# Patient Record
Sex: FEMALE | Race: WHITE | NOT HISPANIC OR LATINO | Employment: OTHER | URBAN - NONMETROPOLITAN AREA
[De-identification: names, ages, dates, MRNs, and addresses within clinical notes are randomized per-mention and may not be internally consistent; named-entity substitution may affect disease eponyms.]

---

## 2017-03-26 ENCOUNTER — HOSPITAL ENCOUNTER (EMERGENCY)
Facility: HOSPITAL | Age: 57
End: 2017-03-26
Attending: EMERGENCY MEDICINE | Admitting: EMERGENCY MEDICINE

## 2017-03-26 ENCOUNTER — APPOINTMENT (OUTPATIENT)
Dept: GENERAL RADIOLOGY | Facility: HOSPITAL | Age: 57
End: 2017-03-26

## 2017-03-26 VITALS
HEIGHT: 68 IN | TEMPERATURE: 96.9 F | DIASTOLIC BLOOD PRESSURE: 78 MMHG | HEART RATE: 109 BPM | OXYGEN SATURATION: 96 % | SYSTOLIC BLOOD PRESSURE: 97 MMHG | WEIGHT: 138.89 LBS | BODY MASS INDEX: 21.05 KG/M2 | RESPIRATION RATE: 20 BRPM

## 2017-03-26 DIAGNOSIS — R74.8 ELEVATED AMYLASE AND LIPASE: ICD-10-CM

## 2017-03-26 DIAGNOSIS — R79.89 ELEVATED BRAIN NATRIURETIC PEPTIDE (BNP) LEVEL: ICD-10-CM

## 2017-03-26 DIAGNOSIS — R09.2 RESPIRATORY ARREST (HCC): ICD-10-CM

## 2017-03-26 DIAGNOSIS — I46.9 CARDIAC ARREST (HCC): Primary | ICD-10-CM

## 2017-03-26 DIAGNOSIS — D72.829 LEUKOCYTOSIS, UNSPECIFIED TYPE: ICD-10-CM

## 2017-03-26 DIAGNOSIS — J18.9 PNEUMONIA OF BOTH UPPER LOBES DUE TO INFECTIOUS ORGANISM: ICD-10-CM

## 2017-03-26 PROBLEM — I25.10 CORONARY ARTERY DISEASE: Status: ACTIVE | Noted: 2017-03-26

## 2017-03-26 PROBLEM — J44.9 COPD (CHRONIC OBSTRUCTIVE PULMONARY DISEASE) (HCC): Status: ACTIVE | Noted: 2017-03-26

## 2017-03-26 PROBLEM — I71.40 AAA (ABDOMINAL AORTIC ANEURYSM) (HCC): Status: ACTIVE | Noted: 2017-03-26

## 2017-03-26 PROBLEM — I50.9 CHF (CONGESTIVE HEART FAILURE) (HCC): Status: ACTIVE | Noted: 2017-03-26

## 2017-03-26 LAB
ALBUMIN SERPL-MCNC: 3.1 G/DL (ref 3.5–5)
ALBUMIN/GLOB SERPL: 1.3 G/DL (ref 1.1–2.5)
ALP SERPL-CCNC: 80 U/L (ref 24–120)
ALT SERPL W P-5'-P-CCNC: 59 U/L (ref 0–54)
AMPHET+METHAMPHET UR QL: NEGATIVE
AMYLASE SERPL-CCNC: 334 U/L (ref 30–110)
ANION GAP SERPL CALCULATED.3IONS-SCNC: 15 MMOL/L (ref 4–13)
APTT PPP: 32.4 SECONDS (ref 24.1–34.8)
ARTERIAL PATENCY WRIST A: ABNORMAL
AST SERPL-CCNC: 50 U/L (ref 7–45)
ATMOSPHERIC PRESS: ABNORMAL MMHG
BACTERIA UR QL AUTO: ABNORMAL /HPF
BARBITURATES UR QL SCN: NEGATIVE
BASE EXCESS BLDA CALC-SCNC: -8.6 MMOL/L (ref -2–2)
BDY SITE: ABNORMAL
BENZODIAZ UR QL SCN: POSITIVE
BILIRUB SERPL-MCNC: 0.2 MG/DL (ref 0.1–1)
BILIRUB UR QL STRIP: NEGATIVE
BUN BLD-MCNC: 30 MG/DL (ref 5–21)
BUN/CREAT SERPL: 28.3 (ref 7–25)
CALCIUM SPEC-SCNC: 8.4 MG/DL (ref 8.4–10.4)
CANNABINOIDS SERPL QL: NEGATIVE
CHLORIDE SERPL-SCNC: 89 MMOL/L (ref 98–110)
CK MB SERPL-CCNC: 6.41 NG/ML (ref 0–5)
CK MB SERPL-RTO: 4.5 % (ref 0–5.7)
CK SERPL-CCNC: 143 U/L (ref 0–203)
CLARITY UR: CLEAR
CO2 SERPL-SCNC: 28 MMOL/L (ref 24–31)
COCAINE UR QL: NEGATIVE
COHGB MFR BLD: 5.1 % (ref 0–5.1)
COLOR UR: YELLOW
CREAT BLD-MCNC: 1.06 MG/DL (ref 0.5–1.4)
D DIMER PPP FEU-MCNC: 4.32 MG/L (FEU) (ref 0–0.5)
DEPRECATED RDW RBC AUTO: 61.2 FL (ref 40–54)
ERYTHROCYTE [DISTWIDTH] IN BLOOD BY AUTOMATED COUNT: 15 % (ref 12–15)
ETHANOL UR QL: <0.01 %
GFR SERPL CREATININE-BSD FRML MDRD: 53 ML/MIN/1.73
GLOBULIN UR ELPH-MCNC: 2.3 GM/DL
GLUCOSE BLD-MCNC: 298 MG/DL (ref 70–100)
GLUCOSE UR STRIP-MCNC: NEGATIVE MG/DL
HCO3 BLDA-SCNC: 23.4 MMOL/L (ref 22–26)
HCT VFR BLD AUTO: 43.7 % (ref 37–47)
HGB BLD-MCNC: 12.6 G/DL (ref 12–16)
HGB BLDA-MCNC: 13.8 G/DL (ref 12–16)
HGB UR QL STRIP.AUTO: ABNORMAL
HOLD SPECIMEN: NORMAL
HOLD SPECIMEN: NORMAL
HOROWITZ INDEX BLD+IHG-RTO: 100 %
HYALINE CASTS UR QL AUTO: ABNORMAL /LPF
INR PPP: 1.02 (ref 0.91–1.09)
KETONES UR QL STRIP: NEGATIVE
LEUKOCYTE ESTERASE UR QL STRIP.AUTO: NEGATIVE
LIPASE SERPL-CCNC: 2390 U/L (ref 23–203)
LYMPHOCYTES # BLD MANUAL: 1.19 10*3/MM3 (ref 0.72–4.86)
LYMPHOCYTES NFR BLD MANUAL: 4 % (ref 15–45)
LYMPHOCYTES NFR BLD MANUAL: 6 % (ref 4–12)
Lab: ABNORMAL
MACROCYTES BLD QL SMEAR: ABNORMAL
MAGNESIUM SERPL-MCNC: 2.6 MG/DL (ref 1.4–2.2)
MCH RBC QN AUTO: 32.1 PG (ref 28–32)
MCHC RBC AUTO-ENTMCNC: 28.8 G/DL (ref 33–36)
MCV RBC AUTO: 111.5 FL (ref 82–98)
METAMYELOCYTES NFR BLD MANUAL: 4 % (ref 0–0)
METHADONE UR QL SCN: NEGATIVE
METHGB BLD QL: 0.3 % (ref 0.4–1.5)
MODALITY: ABNORMAL
MONOCYTES # BLD AUTO: 1.79 10*3/MM3 (ref 0.19–1.3)
MYOGLOBIN SERPL-MCNC: 719.8 NG/ML (ref 0–110)
NEUTROPHILS # BLD AUTO: 24.17 10*3/MM3 (ref 1.87–8.4)
NEUTROPHILS NFR BLD MANUAL: 72 % (ref 39–78)
NEUTS BAND NFR BLD MANUAL: 9 % (ref 0–10)
NITRITE UR QL STRIP: NEGATIVE
NOTIFIED BY: ABNORMAL
NOTIFIED WHO: ABNORMAL
NT-PROBNP SERPL-MCNC: ABNORMAL PG/ML (ref 0–900)
OPIATES UR QL: POSITIVE
OXYHGB MFR BLDV: 94.4 % (ref 94–97)
PCO2 BLDA: 84.3 MM HG (ref 35–45)
PCP UR QL SCN: NEGATIVE
PEEP RESPIRATORY: 5 CM[H2O]
PH BLDA: 7.06 PH UNITS (ref 7.35–7.45)
PH UR STRIP.AUTO: 5.5 [PH] (ref 5–8)
PLAT MORPH BLD: NORMAL
PLATELET # BLD AUTO: 168 10*3/MM3 (ref 130–400)
PMV BLD AUTO: 10.3 FL (ref 6–12)
PO2 BLDA: 295.9 MM HG (ref 80–100)
POTASSIUM BLD-SCNC: 5.3 MMOL/L (ref 3.5–5.3)
POTASSIUM BLDA-SCNC: 5.71 MMOL/L (ref 3.5–5)
PROCALCITONIN SERPL-MCNC: <0.25 NG/ML
PROT SERPL-MCNC: 5.4 G/DL (ref 6.3–8.7)
PROT UR QL STRIP: ABNORMAL
PROTHROMBIN TIME: 13.7 SECONDS (ref 11.9–14.6)
RBC # BLD AUTO: 3.92 10*6/MM3 (ref 4.2–5.4)
RBC # UR: ABNORMAL /HPF
REF LAB TEST METHOD: ABNORMAL
SCAN SLIDE: NORMAL
SODIUM BLD-SCNC: 132 MMOL/L (ref 135–145)
SODIUM BLDA-SCNC: 130 MMOL/L (ref 135–145)
SP GR UR STRIP: 1.02 (ref 1–1.03)
SQUAMOUS #/AREA URNS HPF: ABNORMAL /HPF
TOTAL RATE: 12 BREATHS/MINUTE
TROPONIN I SERPL-MCNC: 0.02 NG/ML (ref 0–0.03)
UROBILINOGEN UR QL STRIP: ABNORMAL
VARIANT LYMPHS NFR BLD MANUAL: 5 % (ref 0–5)
VENTILATOR MODE: ABNORMAL
VT ON VENT VENT: 510 ML
WBC MORPH BLD: NORMAL
WBC NRBC COR # BLD: 29.84 10*3/MM3 (ref 4.8–10.8)
WBC UR QL AUTO: ABNORMAL /HPF
WHOLE BLOOD HOLD SPECIMEN: NORMAL
WHOLE BLOOD HOLD SPECIMEN: NORMAL

## 2017-03-26 PROCEDURE — 94799 UNLISTED PULMONARY SVC/PX: CPT

## 2017-03-26 PROCEDURE — 71010 HC CHEST PA OR AP: CPT

## 2017-03-26 PROCEDURE — 94770: CPT

## 2017-03-26 PROCEDURE — 87086 URINE CULTURE/COLONY COUNT: CPT | Performed by: EMERGENCY MEDICINE

## 2017-03-26 PROCEDURE — 80307 DRUG TEST PRSMV CHEM ANLYZR: CPT | Performed by: EMERGENCY MEDICINE

## 2017-03-26 PROCEDURE — 82553 CREATINE MB FRACTION: CPT | Performed by: EMERGENCY MEDICINE

## 2017-03-26 PROCEDURE — 85379 FIBRIN DEGRADATION QUANT: CPT | Performed by: EMERGENCY MEDICINE

## 2017-03-26 PROCEDURE — 87040 BLOOD CULTURE FOR BACTERIA: CPT | Performed by: EMERGENCY MEDICINE

## 2017-03-26 PROCEDURE — 84145 PROCALCITONIN (PCT): CPT | Performed by: EMERGENCY MEDICINE

## 2017-03-26 PROCEDURE — 82550 ASSAY OF CK (CPK): CPT | Performed by: EMERGENCY MEDICINE

## 2017-03-26 PROCEDURE — 99291 CRITICAL CARE FIRST HOUR: CPT

## 2017-03-26 PROCEDURE — 80053 COMPREHEN METABOLIC PANEL: CPT | Performed by: EMERGENCY MEDICINE

## 2017-03-26 PROCEDURE — 87186 SC STD MICRODIL/AGAR DIL: CPT | Performed by: EMERGENCY MEDICINE

## 2017-03-26 PROCEDURE — 25010000002 EPINEPHRINE 0.1 MG/ML SOLUTION PREFILLED SYRINGE: Performed by: EMERGENCY MEDICINE

## 2017-03-26 PROCEDURE — 94002 VENT MGMT INPAT INIT DAY: CPT

## 2017-03-26 PROCEDURE — 85025 COMPLETE CBC W/AUTO DIFF WBC: CPT | Performed by: EMERGENCY MEDICINE

## 2017-03-26 PROCEDURE — 82150 ASSAY OF AMYLASE: CPT | Performed by: EMERGENCY MEDICINE

## 2017-03-26 PROCEDURE — 85730 THROMBOPLASTIN TIME PARTIAL: CPT | Performed by: EMERGENCY MEDICINE

## 2017-03-26 PROCEDURE — 83880 ASSAY OF NATRIURETIC PEPTIDE: CPT | Performed by: EMERGENCY MEDICINE

## 2017-03-26 PROCEDURE — 96375 TX/PRO/DX INJ NEW DRUG ADDON: CPT

## 2017-03-26 PROCEDURE — 83874 ASSAY OF MYOGLOBIN: CPT | Performed by: EMERGENCY MEDICINE

## 2017-03-26 PROCEDURE — 92950 HEART/LUNG RESUSCITATION CPR: CPT

## 2017-03-26 PROCEDURE — 85007 BL SMEAR W/DIFF WBC COUNT: CPT | Performed by: EMERGENCY MEDICINE

## 2017-03-26 PROCEDURE — 96365 THER/PROPH/DIAG IV INF INIT: CPT

## 2017-03-26 PROCEDURE — 83050 HGB METHEMOGLOBIN QUAN: CPT

## 2017-03-26 PROCEDURE — 87185 SC STD ENZYME DETCJ PER NZM: CPT | Performed by: EMERGENCY MEDICINE

## 2017-03-26 PROCEDURE — 87205 SMEAR GRAM STAIN: CPT | Performed by: EMERGENCY MEDICINE

## 2017-03-26 PROCEDURE — 93005 ELECTROCARDIOGRAM TRACING: CPT | Performed by: EMERGENCY MEDICINE

## 2017-03-26 PROCEDURE — 85610 PROTHROMBIN TIME: CPT | Performed by: EMERGENCY MEDICINE

## 2017-03-26 PROCEDURE — 83735 ASSAY OF MAGNESIUM: CPT | Performed by: EMERGENCY MEDICINE

## 2017-03-26 PROCEDURE — 36600 WITHDRAWAL OF ARTERIAL BLOOD: CPT

## 2017-03-26 PROCEDURE — 93010 ELECTROCARDIOGRAM REPORT: CPT | Performed by: INTERNAL MEDICINE

## 2017-03-26 PROCEDURE — 84484 ASSAY OF TROPONIN QUANT: CPT | Performed by: EMERGENCY MEDICINE

## 2017-03-26 PROCEDURE — 83690 ASSAY OF LIPASE: CPT | Performed by: EMERGENCY MEDICINE

## 2017-03-26 PROCEDURE — 93005 ELECTROCARDIOGRAM TRACING: CPT

## 2017-03-26 PROCEDURE — 81001 URINALYSIS AUTO W/SCOPE: CPT | Performed by: EMERGENCY MEDICINE

## 2017-03-26 PROCEDURE — 82375 ASSAY CARBOXYHB QUANT: CPT

## 2017-03-26 PROCEDURE — 82805 BLOOD GASES W/O2 SATURATION: CPT

## 2017-03-26 PROCEDURE — 87150 DNA/RNA AMPLIFIED PROBE: CPT | Performed by: EMERGENCY MEDICINE

## 2017-03-26 PROCEDURE — 87147 CULTURE TYPE IMMUNOLOGIC: CPT | Performed by: EMERGENCY MEDICINE

## 2017-03-26 RX ORDER — ROCURONIUM BROMIDE 10 MG/ML
50 INJECTION, SOLUTION INTRAVENOUS ONCE
Status: COMPLETED | OUTPATIENT
Start: 2017-03-26 | End: 2017-03-26

## 2017-03-26 RX ORDER — NOREPINEPHRINE BITARTRATE 1 MG/ML
INJECTION, SOLUTION INTRAVENOUS
Status: DISCONTINUED
Start: 2017-03-26 | End: 2017-03-26 | Stop reason: HOSPADM

## 2017-03-26 RX ADMIN — EPINEPHRINE 1 MG: 0.1 INJECTION, SOLUTION ENDOTRACHEAL; INTRACARDIAC; INTRAVENOUS at 07:21

## 2017-03-26 RX ADMIN — EPINEPHRINE 1 MG: 0.1 INJECTION, SOLUTION ENDOTRACHEAL; INTRACARDIAC; INTRAVENOUS at 07:18

## 2017-03-26 RX ADMIN — EPINEPHRINE 1 MG: 0.1 INJECTION, SOLUTION ENDOTRACHEAL; INTRACARDIAC; INTRAVENOUS at 08:00

## 2017-03-26 RX ADMIN — EPINEPHRINE 1 MG: 0.1 INJECTION, SOLUTION ENDOTRACHEAL; INTRACARDIAC; INTRAVENOUS at 07:24

## 2017-03-26 RX ADMIN — EPINEPHRINE 1 MG: 0.1 INJECTION, SOLUTION ENDOTRACHEAL; INTRACARDIAC; INTRAVENOUS at 08:13

## 2017-03-26 RX ADMIN — NOREPINEPHRINE BITARTRATE 0.04 MCG/KG/MIN: 1 INJECTION INTRAVENOUS at 07:46

## 2017-03-26 RX ADMIN — EPINEPHRINE 1 MG: 0.1 INJECTION, SOLUTION ENDOTRACHEAL; INTRACARDIAC; INTRAVENOUS at 07:56

## 2017-03-26 RX ADMIN — SODIUM BICARBONATE 50 MEQ: 84 INJECTION, SOLUTION INTRAVENOUS at 07:58

## 2017-03-26 RX ADMIN — NOREPINEPHRINE BITARTRATE 0.06 MCG/KG/MIN: 1 INJECTION INTRAVENOUS at 08:22

## 2017-03-26 RX ADMIN — EPINEPHRINE 1 MG: 0.1 INJECTION, SOLUTION ENDOTRACHEAL; INTRACARDIAC; INTRAVENOUS at 08:17

## 2017-03-26 RX ADMIN — NOREPINEPHRINE BITARTRATE 0.02 MCG/KG/MIN: 1 INJECTION INTRAVENOUS at 07:31

## 2017-03-26 RX ADMIN — ROCURONIUM BROMIDE 50 MG: 10 INJECTION INTRAVENOUS at 07:10

## 2017-03-26 RX ADMIN — EPINEPHRINE 1 MG: 0.1 INJECTION, SOLUTION ENDOTRACHEAL; INTRACARDIAC; INTRAVENOUS at 08:21

## 2017-03-26 NOTE — ED PROVIDER NOTES
Subjective   HPI Comments: Patient is a 57-year-old female with history about in by EMS.  EMS reports that they were called to the patient's residence and they arrived at 0618 AM this morning and found the patient sitting on the toilet and was unresponsive and in asystole.  They report that a intubated the patient and began chest compressions (at 06:20) and gave the patient two epinephrine treatments and then at 06:34 the patient was noted to have a return of her heart rate and pulse.  They report the family member that was present at the residence could give no medical history about the patient.      History provided by:  EMS personnel      Review of Systems   Unable to perform ROS: Intubated       Past Medical History:   Diagnosis Date   • AAA (abdominal aortic aneurysm)    • CHF (congestive heart failure)    • COPD (chronic obstructive pulmonary disease)    • Coronary artery disease    • Migraine    • Rheumatoid arthritis    • Stroke        Allergies   Allergen Reactions   • Aspirin    • Codeine    • Iodides    • Penicillins    • Sumatriptan        Past Surgical History:   Procedure Laterality Date   • APPENDECTOMY     • CARDIAC CATHETERIZATION     • HYSTERECTOMY     • TONSILLECTOMY         History reviewed. No pertinent family history.    Social History     Social History   • Marital status: Unknown     Spouse name: N/A   • Number of children: N/A   • Years of education: N/A     Social History Main Topics   • Smoking status: Heavy Tobacco Smoker     Packs/day: 2.50     Years: 35.00   • Smokeless tobacco: None   • Alcohol use No   • Drug use: Yes     Special: Hydrocodone, Benzodiazepines   • Sexual activity: Defer     Other Topics Concern   • None     Social History Narrative   • None           Objective   Physical Exam   Constitutional:   Patient is intubated and unresponsive.   HENT:   Head: Normocephalic and atraumatic.   Right Ear: External ear normal.   Left Ear: External ear normal.   Eyes: Right eye  exhibits no discharge. Left eye exhibits no discharge.   Pupils equal and dilated and do not respond to light stimuli.   Neck: Normal range of motion.   Cardiovascular: Regular rhythm.    Tachycardia. Distant heart sounds.   Pulmonary/Chest:   Bilateral air entry by ventilator. Patient is not breathing.   Abdominal: Soft.   Musculoskeletal: She exhibits no edema.   Unresponsive.   Neurological:   Unresponsive   Skin: No erythema.   Psychiatric:   Unresponsive   Nursing note and vitals reviewed.      ECG 12 Lead    Date/Time: 3/26/2017 6:51 AM  Performed by: LUIS FERNANDO RIVERA  Authorized by: LUIS FERNANDO RIVERA   Interpreted by physician  Rhythm: sinus tachycardia  BPM: 113  Comments: Right atrial enlargement; Pulmonary disease pattern; Marked ST abnormality, possible inferior subendocardial injury.    Insert Central Line At Bedside  Date/Time: 3/26/2017 8:17 AM  Performed by: LUIS FERNANDO RIVERA  Authorized by: LUIS FERNANDO RIVERA   Consent: The procedure was performed in an emergent situation.  Patient identity confirmed: arm band  Indications: vascular access  Preparation: skin prepped with Betadine  Skin prep agent dried: skin prep agent completely dried prior to procedure  Sterile barriers: all five maximum sterile barriers used - cap, mask, sterile gown, sterile gloves, and large sterile sheet  Hand hygiene: hand hygiene performed prior to central venous catheter insertion  Location details: right femoral  Patient position: flat  Catheter type: triple lumen  Catheter size: 7 Fr  Pre-procedure: landmarks identified  Number of attempts: 1  Successful placement: yes  Post-procedure: line sutured  Assessment: blood return through all ports  Patient tolerance: Patient tolerated the procedure well with no immediate complications    Chest Tube Insertion  Date/Time: 3/26/2017 8:28 AM  Performed by: LUIS FERNANDO RIVERA  Authorized by: LUIS FERNANDO RIVERA   Consent: The procedure was performed in an  emergent situation.  Patient identity confirmed: arm band  Indications: pneumothorax  Preparation: skin prepped with Betadine  Placement location: left lateral  Tube size: 32 Cambodian  Dissection instrument: Nicky clamp  Ultrasound guidance: no  Tension pneumothorax heard: no  Tube connected to: suction  Drainage characteristics: bloody  Suture material: 0 silk  Dressing: 4x4 sterile gauze  Patient tolerance: Patient tolerated the procedure well with no immediate complications    Critical Care  Performed by: COLTON RIVERA  Authorized by: COLTON RIVERA   Total critical care time: 46 minutes  Critical care time was exclusive of separately billable procedures and treating other patients.  Critical care was necessary to treat or prevent imminent or life-threatening deterioration of the following conditions: cardiac failure and respiratory failure.  Critical care was time spent personally by me on the following activities: discussions with primary provider, evaluation of patient's response to treatment, examination of patient, ordering and performing treatments and interventions, ordering and review of laboratory studies, ordering and review of radiographic studies and re-evaluation of patient's condition.               ED Course  ED Course   Comment By Time   Patient's case was discussed with Dr. Darwin Patterson and he agrees to come to the emergency department and admit the patient.  He reviewed the chest x-ray and he does not feel that the patient needs a chest tube at this time. Colton Rivera MD 03/26 9354   Dr.Maurice Patterson reports he has discussed this patient with the patient's brother (reported as the only family member) and Dr. Patterson reports that the patient's brother wants the patient extubated.  He reportedly told Dr. Patterson that the patient had been getting medically worse over the last year. Colton Rivera MD 03/26 8280      Patient had intermittent loss of pulse and CPR  was always started and the patient would have a return pulse (see nurses' notes for complete reports).    Nursing reported the patient was extubated (per family request) and the patient was called death of time at 09:00.        MDM  Number of Diagnoses or Management Options  Cardiac arrest: new and requires workup  Elevated amylase and lipase: new and requires workup  Elevated brain natriuretic peptide (BNP) level: new and requires workup  Leukocytosis, unspecified type: new and requires workup  Pneumonia of both upper lobes due to infectious organism: new and requires workup  Respiratory arrest: new and requires workup     Amount and/or Complexity of Data Reviewed  Clinical lab tests: ordered and reviewed  Tests in the radiology section of CPT®: ordered and reviewed  Discuss the patient with other providers: yes    Risk of Complications, Morbidity, and/or Mortality  Presenting problems: high  Diagnostic procedures: high  Management options: high    Patient Progress  Patient progress: other (comment) (Patient is to be extubated per order of hospitalist.)      Final diagnoses:   Cardiac arrest   Pneumonia of both upper lobes due to infectious organism   Leukocytosis, unspecified type   Elevated brain natriuretic peptide (BNP) level   Respiratory arrest   Elevated amylase and lipase            Colton Anderson MD  03/26/17 1122

## 2017-03-26 NOTE — DISCHARGE SUMMARY
[facility] Medicine Services  DEATH SUMMARY       Date of Admission: 3/26/2017  Date of Death:  3/26/2017 at 0900  Primary Care Physician: ZAC Norris    Presenting Problem/History of Present Illness:  Cardiac arrest [I46.9]     Final Death Diagnoses:  Hospital Problem List     * (Principal)Cardiac arrest    COPD (chronic obstructive pulmonary disease)    Coronary artery disease    CHF (congestive heart failure)    AAA (abdominal aortic aneurysm)          Consults: None    Procedures Performed:   Intubation with ventilation  Left chest tube insertion  Cardiopulmonary resuscitation ×3  Right femoral line placement        Pertinent Test Results:    Specimen:  Blood Updated:  03/26/17 0720     Glucose 298 (H) mg/dL      BUN 30 (H) mg/dL      Creatinine 1.06 mg/dL      Sodium 132 (L) mmol/L      Potassium 5.3 mmol/L      Chloride 89 (L) mmol/L      CO2 28.0 mmol/L      Calcium 8.4 mg/dL      Total Protein 5.4 (L) g/dL      Albumin 3.10 (L) g/dL      ALT (SGPT) 59 (H) U/L      AST (SGOT) 50 (H) U/L      Alkaline Phosphatase 80 U/L      Total Bilirubin 0.2 mg/dL      eGFR Non African Amer 53 (L) mL/min/1.73      Globulin 2.3 gm/dL      A/G Ratio 1.3 g/dL      BUN/Creatinine Ratio 28.3 (H)     Anion Gap 15.0 (H) mmol/L     Amylase [[id number]]  (Abnormal) Collected:  03/26/17 0654    Specimen:  Blood Updated:  03/26/17 0720     Amylase 334 (H) U/L     Magnesium [[id number]]  (Abnormal) Collected:  03/26/17 0654    Specimen:  Blood Updated:  03/26/17 0720     Magnesium 2.6 (H) mg/dL     Ethanol [[id number]]  (Normal) Collected:  03/26/17 0654    Specimen:  Blood Updated:  03/26/17 0720     Ethanol % <0.010 %     Narrative:       Not for legal purposes. Chain of Custody not followed.     Protime-INR [[id number]]  (Normal) Collected:  03/26/17 0654    Specimen:  Blood Updated:  03/26/17 0729     Protime 13.7 Seconds      INR 1.02    aPTT [[id number]]  (Normal) Collected:  03/26/17 0654     Specimen:  Blood Updated:  03/26/17 0729     PTT 32.4 seconds     Myoglobin, Serum [[id number]]  (Abnormal) Collected:  03/26/17 0654    Specimen:  Blood Updated:  03/26/17 0731     Myoglobin 719.8 (H) ng/mL     BNP [[id number]]  (Abnormal) Collected:  03/26/17 0654    Specimen:  Blood Updated:  03/26/17 0731     proBNP 95215.0 (H) pg/mL     Troponin [[id number]]  (Normal) Collected:  03/26/17 0654    Specimen:  Blood Updated:  03/26/17 0731     Troponin I 0.023 ng/mL     Lipase [[id number]]  (Abnormal) Collected:  03/26/17 0654    Specimen:  Blood Updated:  03/26/17 0735     Lipase 2390 (H) U/L     Blood Gas, Arterial With Co-Ox [[id number]]  (Abnormal) Collected:  03/26/17 0659    Specimen:  Arterial Blood Updated:  03/26/17 0736     Site Arterial: right brachial     Sagar's Test --      Documented in Rapid Comm        pH, Arterial 7.062 (C) pH units      pCO2, Arterial 84.3 (C) mm Hg      pO2, Arterial 295.9 (H) mm Hg      HCO3, Arterial 23.4 mmol/L      Base Excess, Arterial -8.6 (L) mmol/L      Hemoglobin, Blood Gas 13.8 g/dL      Oxyhemoglobin 94.4 %      Methemoglobin 0.3 (L) %      Carboxyhemoglobin 5.1 %      Sodium, Arterial 130.0 (L) mmol/L      Potassium, Arterial 5.71 (H) mmol/L      Barometric Pressure for Blood Gas -- mmHg       Component not reported at this site.        Modality Ventilator     FIO2 100 %      Ventilator Mode Assist     Set Tidal Volume 510     Rate 12.0 Breaths/minute      PEEP 5.0    Specimen:  Blood Updated:  03/26/17 0737     WBC 29.84 (H) 10*3/mm3      RBC 3.92 (L) 10*6/mm3      Hemoglobin 12.6 g/dL      Hematocrit 43.7 %      .5 (H) fL      MCH 32.1 (H) pg      MCHC 28.8 (L) g/dL      RDW 15.0 %      RDW-SD 61.2 (H) fl      MPV 10.3 fL      Platelets 168 10*3/mm3     Scan Slide [[id number]] Collected:  03/26/17 0654    Specimen:  Blood Updated:  03/26/17 0737     Scan Slide --      See Manual Differential Results       Manual Differential [[id number]]  (Abnormal) Collected:  03/26/17  0654    Specimen:  Blood Updated:  03/26/17 0737     Neutrophil % 72.0 %       Appended report.  These results have been appended to a previously final verified report.        Lymphocyte % 4.0 (L) %       Appended report.  These results have been appended to a previously final verified report.        Monocyte % 6.0 %       Appended report.  These results have been appended to a previously final verified report.        Bands %  9.0 %       Appended report.  These results have been appended to a previously final verified report.        Metamyelocyte % 4.0 (H) %       Appended report.  These results have been appended to a previously final verified report.        Atypical Lymphocyte % 5.0 %       Appended report.  These results have been appended to a previously final verified report.        Neutrophils Absolute 24.17 (H) 10*3/mm3       Appended report.  These results have been appended to a previously final verified report.        Lymphocytes Absolute 1.19 10*3/mm3       Appended report.  These results have been appended to a previously final verified report.        Monocytes Absolute 1.79 (H) 10*3/mm3       Appended report.  These results have been appended to a previously final verified report.        Macrocytes Mod/2+     WBC Morphology Normal     Platelet Morphology Normal    D-dimer, Quantitative [[id number]]  (Abnormal) Collected:  03/26/17 0654    Specimen:  Blood Updated:  03/26/17 0738     D-Dimer, Quantitative 4.32 (H) mg/L (FEU)     Narrative:       Reference Range is 0-0.50 mg/L FEU. However, results <0.50 mg/L FEU tends to rule out DVT or PE. Results >0.50 mg/L FEU are not useful in predicting absence or presence of DVT or PE.    CK-MB [[id number]]  (Abnormal) Collected:  03/26/17 0654    Specimen:  Blood Updated:  03/26/17 0739     CKMB 6.41 (H) ng/mL     CK [[id number]]  (Normal) Collected:  03/26/17 0654    Specimen:  Blood Updated:  03/26/17 0739     Creatine Kinase 143 U/L     CK-MB Index [[id number]]   (Normal) Collected:  03/26/17 0654    Specimen:  Blood Updated:  03/26/17 0739     CK-MB Index 4.5 %     Procalcitonin [[id number]]  (Normal) Collected:  03/26/17 0654    Specimen:  Blood Updated:  03/26/17 0747     Procalcitonin <0.25 ng/mL     Urine Drug Screen [[id number]]  (Abnormal) Collected:  03/26/17 0733    Specimen:  Urine from Urine, Clean Catch Updated:  03/26/17 0759     Amphetamine Screen, Urine Negative     Barbiturates Screen, Urine Negative     Benzodiazepine Screen, Urine Positive (A)     Cocaine Screen, Urine Negative     Methadone Screen, Urine Negative     Opiate Screen Positive (A)     Phencyclidine (PCP), Urine Negative     THC, Screen, Urine Negative    Urine Culture [[id number]] Collected:  03/26/17 0733    Specimen:  Urine from Urine, Catheter Updated:  03/26/17 0802    Urinalysis With / Culture If Indicated [[id number]]  (Abnormal) Collected:  03/26/17 0733    Specimen:  Urine from Urine, Catheter Updated:  03/26/17 0802     Color, UA Yellow     Appearance, UA Clear     pH, UA 5.5     Specific Gravity, UA 1.025     Glucose, UA Negative     Ketones, UA Negative     Bilirubin, UA Negative     Blood, UA Trace (A)     Protein,  mg/dL (2+) (A)     Leuk Esterase, UA Negative     Nitrite, UA Negative     Urobilinogen, UA 1.0 E.U./dL    Urinalysis, Microscopic Only [[id number]]  (Abnormal) Collected:  03/26/17 0733    Specimen:  Urine from Urine, Catheter Updated:  03/26/17 0802     RBC, UA 0-2 (A) /HPF      WBC, UA 3-5 (A) /HPF      Bacteria, UA 1+ (A) /HPF      Squamous Epithelial Cells, UA 7-12 (A) /HPF      Hyaline Casts, UA 0-2 /LPF      Methodology Manual Light Microscopy        Imaging Results (last 7 days)     Procedure Component Value Units Date/Time    XR Chest 1 View [[id number]] Collected:  03/26/17 0756     Updated:  03/26/17 0757    Narrative:       Exam:   XR CHEST 1 VW-       Date:  03/26/2017     History:  Female, age  57 years;ET TUBE PLACEMENT     COMPARISON:  None.     Findings  :  Endotracheal tube in place with tip projecting at the expected location  of the right main bronchus. There is a left pneumothorax with a maximum  air gap of 2.7 cm. Bilateral opacities, worse in the left lung apex.  There may be a atelectasis component to that focal worsening. There is  also a 3.1 cm focal opacity in the right upper lobe. No acute osseous  pathology.       Impression:       Impression:  1. Left pneumothorax, maximum air gap of 2.6 cm. BONI Stephens notified  on 03/26/2017 at 7:49 AM  2. Endotracheal tube projecting over the right main bronchus. Recommend  retraction by at least 3 cm.  3. Bilateral opacities as described but with more focal 3.1 cm opacity  in the right upper lobe which may be infectious versus neoplastic.  This report was finalized on  by Dr. Alva Vogel MD.    XR Chest 1 View [[id number]] Collected:  03/26/17 0842     Updated:  03/26/17 0842    Narrative:       Exam:   XR CHEST 1 VW-       Date:  03/26/2017      History:  Female, age  57 years;et tube placement; I46.9-Cardiac arrest,  cause unspecified; J18.9-Pneumonia, unspecified organism;  D72.829-Elevated white blood cell count, unspecified; R79.89-Other  specified abnormal findings of blood chemistry; R09.2-Respiratory  arrest; R74.8-Abnormal levels of other serum enzymes     COMPARISON:  Chest x-ray dated earlier today     Findings :  Endotracheal tube has been repositioned with tip now projecting  approximately 2.7 cm above the serenity. Left-sided pneumothorax is  increasing, more so laterally and inferiorly. Bilateral upper lobe  opacities redemonstrated. Right more mediastinal shift is similar.       Impression:       Impression:  1.  Increasing left pneumothorax.  2.  Repositioning of the endotracheal tube now 2.7 cm above the serenity.     This report was finalized on  by Dr. Alva Vogel MD.    XR Chest 1 View [[id number]] Updated:  03/26/17 0853            Hospital Course:  The patient is a 57 y.o. female who  presented to [facility] at's 0650 hrs. via EMS in cardiac arrest.  I arrived in the emergency room 0730 hrs. There was no family in attendance when the patient arrived.  Majority of historical information was derived from EMS upon arrival and from the patient's brother at the time of his arrival roughly 1 1/2 hours later.  The patient was apparently found by family on the toilet this morning pulseless and apneic.  They were unable to initiate CPR but did call 911 and EMS was dispatched.  12 minutes elapsed between the time that the call was received and arrival of EMS.  The patient's cardiac arrest was unwitnessed and time of arrest is unknown.  Time down prior to initiation of resuscitative efforts is also unknown.  Cardiomegaly pulmonary resuscitation was initiated in the field with a heartbeat being restored.  The patient was subsequently transported to our facility.  While here the patient arrested 3 more times with cardiopulmonary resuscitation carried out.  A right central femoral line and left chest tube was performed.  Chest x-ray was performed showing evidence of significant pneumothorax on the left and what appears to be a mass in the right apex with possible contralateral extension or metastasis.  The patient did not require sedation throughout the entire process and acute brain injury was highly suspected.  The patient had no spontaneous movements or respiratory effort at any time.  The patient had no family other than a brother and upon his arrival a long discussion was held with him and the patient's best friend.  Her brother has been living with her and had noted a substantial decline in her condition over the past 15 months.  The patient steadfastly refused to visit a physician for evaluation.  Given the patient's multiplicity of problems in her recent significant decline and knowing the patient's wishes himself the patient's brother elected to cease all life-sustaining efforts and place  the patient on comfort care.  The patient was subsequently extubated and pressors were discontinued and the patient passed away comfortably at 0900 hrs.    The interested reader can refer to emergency room records for details of resuscitation efforts, chest tube insertion and central line placement as well as available medical history.  Review of systems could not be obtained secondary to the patient's overall condition.    Cause of death:  Anoxic brain injury secondary to  Hypoxic respiratory failure secondary to  Cardiac arrest      Darwin Patterson DO  03/26/17  9:22 AM    Approximately 70 minutes of critical care time were spent managing the patient exclusive of billable procedures.

## 2017-03-27 LAB — BACTERIA BLD CULT: ABNORMAL

## 2017-03-28 LAB — BACTERIA SPEC AEROBE CULT: NORMAL

## 2017-03-29 LAB
BACTERIA SPEC AEROBE CULT: ABNORMAL
BACTERIA SPEC AEROBE CULT: ABNORMAL
GRAM STN SPEC: ABNORMAL
ISOLATED FROM: ABNORMAL
